# Patient Record
Sex: FEMALE | Race: WHITE | NOT HISPANIC OR LATINO | URBAN - METROPOLITAN AREA
[De-identification: names, ages, dates, MRNs, and addresses within clinical notes are randomized per-mention and may not be internally consistent; named-entity substitution may affect disease eponyms.]

---

## 2023-04-28 ENCOUNTER — APPOINTMENT (RX ONLY)
Dept: URBAN - METROPOLITAN AREA CLINIC 110 | Facility: CLINIC | Age: 58
Setting detail: DERMATOLOGY
End: 2023-04-28

## 2023-04-28 DIAGNOSIS — L98.8 OTHER SPECIFIED DISORDERS OF THE SKIN AND SUBCUTANEOUS TISSUE: ICD-10-CM

## 2023-04-28 DIAGNOSIS — Z41.9 ENCOUNTER FOR PROCEDURE FOR PURPOSES OTHER THAN REMEDYING HEALTH STATE, UNSPECIFIED: ICD-10-CM

## 2023-04-28 PROCEDURE — ? COUNSELING

## 2023-04-28 PROCEDURE — ? PLATELET RICH PLASMA INJECTION

## 2023-04-28 PROCEDURE — ? DEFER

## 2023-04-28 PROCEDURE — ? BOTOX

## 2023-04-28 PROCEDURE — ? SILHOUETTE LIFT

## 2023-04-28 PROCEDURE — ? INVENTORY

## 2023-04-28 PROCEDURE — ? PRESCRIPTION

## 2023-04-28 RX ORDER — TAZAROTENE 0.45 MG/G
LOTION TOPICAL
Qty: 45 | Refills: 1 | Status: ERX | COMMUNITY
Start: 2023-04-28

## 2023-04-28 RX ADMIN — TAZAROTENE: 0.45 LOTION TOPICAL at 00:00

## 2023-04-28 ASSESSMENT — LOCATION SIMPLE DESCRIPTION DERM
LOCATION SIMPLE: RIGHT CHEEK
LOCATION SIMPLE: LEFT CHEEK

## 2023-04-28 ASSESSMENT — LOCATION ZONE DERM: LOCATION ZONE: FACE

## 2023-04-28 ASSESSMENT — LOCATION DETAILED DESCRIPTION DERM
LOCATION DETAILED: LEFT SUPERIOR CENTRAL MALAR CHEEK
LOCATION DETAILED: RIGHT SUPERIOR CENTRAL MALAR CHEEK

## 2023-04-28 NOTE — PROCEDURE: PLATELET RICH PLASMA INJECTION
Amount Injected At This Location In Cc (Will Not Render If 0): 0
Anesthesia Type: 1% lidocaine with epinephrine
Topical Anesthesia Type: 23% lidocaine, 7% tetracaine
Consent: Written consent obtained, risks reviewed including but not limited to pain, scarring, infection and incomplete improvement.  Patient understands the procedure is cosmetic in nature and will require out of pocket payment.
Which Technique?: Default
Venipuncture Paragraph: An alcohol pad was applied to the venipuncture site. Venipuncture was performed using a butterfly needle. Pressure and a bandaid was applied to the site. Patient initialed the vial and the confirmed initials prior to injection of blood. No complications were noted.
Location #1: infraorbital areas
Depth In Mm (Will Not Render If 0): 0.5
Amount Injected At This Location In Cc (Will Not Render If 0): 3
Length Of Topical Anesthesia Application (Optional): 60 minutes
Detail Level: Zone
Post-Care Instructions: After the procedure, take precautions against sun exposure. Do not apply sunscreen for 12 hours after the procedure. Avoid NSAIDs if no contraindications for 1 week
Treatment Number (Optional): 1
Who Performed The Venipuncture?: Darron VILLALOBOS MA
Show Additional Techniques: Yes

## 2023-04-28 NOTE — PROCEDURE: SILHOUETTE LIFT
Anesthesia Type: 1% lidocaine with epinephrine
Post-Care Instructions (For The Patient Hand-Out): I reviewed with the patient in detail post-care instructions. Patient should apply ice packs multiple times a day for a couple days. They were instructed to call if they have any problems.
Detail Level: Zone
Expiration Date (Month Year): 05/2024
Postcare Statement Text: There were no complications and the patient was given postcare instructions and ice packs.
Pre-Procedure Text: The treatment areas were cleaned with alcohol and chlorhexidine. Insertion and exit points were mapped out with the Silhouette ruler and marked with a surgical marker.
Consent: The risks and benefits of the procedure were discussed with the patient.  Specifically the risks of swelling, bruising, bleeding, discomfort, infection, damage to nerves, numbness, allergic reactions, pigment changes, partial correction, rippling or dimpling, asymmetry, redness, bumps or nodules, visibility of threads, and scarring were reviewed. After this, consent was obtained.
Anesthesia Method: local infiltration
Lot #: [id number]
Procedure Text: An 18 gauge needle was used to create the insertions points and the Silhouette Instalift needles with absorbable sutures were inserted subcutaneously in each direction and advanced through to the exit points on either side. The threads were then tightened and cut adjacent to the exit points and allowed to retract into the subcutaneous space.
Number Of Threads: 10
Total Anesthesia Volume In Cc (Optional): 0

## 2023-04-28 NOTE — PROCEDURE: DEFER
Other Procedure: chemical peel , PRP, micro needling, &
Detail Level: Detailed
Introduction Text (Please End With A Colon): The following procedure was deferred:
Size Of Lesion In Cm (Optional): 0

## 2023-04-28 NOTE — PROCEDURE: BOTOX
Show Levator Superior Units: Yes
Additional Area 3 Location: orbicularis oris
Additional Area 4 Units: 0
Show Mentalis Units: No
Glabellar Complex Units: 20
Consent: Written consent obtained. Risks include but not limited to lid/brow ptosis, bruising, swelling, diplopia, temporary effect, incomplete chemical denervation.
Dilution (U/0.1 Cc): 5
Detail Level: Detailed
Additional Area 1 Location: [facility]
Post-Care Instructions: Patient instructed to not lie down for 4 hours and limit physical activity for 24 hours.
Additional Area 2 Location: Tail of brow
Periorbital Skin Units: 24
Show Inventory Tab: Show

## 2023-05-02 ENCOUNTER — APPOINTMENT (RX ONLY)
Dept: URBAN - METROPOLITAN AREA CLINIC 111 | Facility: CLINIC | Age: 58
Setting detail: DERMATOLOGY
End: 2023-05-02

## 2023-05-02 DIAGNOSIS — Z41.9 ENCOUNTER FOR PROCEDURE FOR PURPOSES OTHER THAN REMEDYING HEALTH STATE, UNSPECIFIED: ICD-10-CM

## 2023-05-02 PROCEDURE — ? COSMETIC FOLLOW-UP

## 2023-05-02 PROCEDURE — ? ADDITIONAL NOTES

## 2023-05-02 NOTE — PROCEDURE: COSMETIC FOLLOW-UP
Side Effects Or Complications: Extensive bruising
Treatment Override (Free Text): sillouette thread lift
Global Improvement: Good
Side Effects Override (Free Text): dimpling and superficial thread on lower cheek
Patient Satisfaction: Unsure
Detail Level: Zone

## 2023-05-02 NOTE — PROCEDURE: ADDITIONAL NOTES
Render Risk Assessment In Note?: no
Detail Level: Simple
Additional Notes: Dimple released with 18G needle. Gentle massage used on lower cheek thread with improvement. Pt instructed to apply warm compress.

## 2023-06-16 ENCOUNTER — APPOINTMENT (RX ONLY)
Dept: URBAN - METROPOLITAN AREA CLINIC 110 | Facility: CLINIC | Age: 58
Setting detail: DERMATOLOGY
End: 2023-06-16

## 2023-06-16 VITALS — HEIGHT: 64 IN | WEIGHT: 145 LBS

## 2023-06-16 DIAGNOSIS — Z41.9 ENCOUNTER FOR PROCEDURE FOR PURPOSES OTHER THAN REMEDYING HEALTH STATE, UNSPECIFIED: ICD-10-CM

## 2023-06-16 PROCEDURE — ? COSMETIC FOLLOW-UP

## 2023-06-16 PROCEDURE — ? ADDITIONAL NOTES

## 2023-06-16 PROCEDURE — ? FILLERS

## 2023-06-16 PROCEDURE — ? BOTOX

## 2023-06-16 PROCEDURE — ? PLATELET RICH PLASMA INJECTION

## 2023-06-16 PROCEDURE — ? INVENTORY

## 2023-06-16 ASSESSMENT — LOCATION SIMPLE DESCRIPTION DERM
LOCATION SIMPLE: LEFT CHEEK
LOCATION SIMPLE: LEFT EYEBROW
LOCATION SIMPLE: LEFT INFERIOR EYELID
LOCATION SIMPLE: RIGHT CHEEK
LOCATION SIMPLE: RIGHT EYEBROW

## 2023-06-16 ASSESSMENT — LOCATION DETAILED DESCRIPTION DERM
LOCATION DETAILED: RIGHT SUPERIOR MEDIAL BUCCAL CHEEK
LOCATION DETAILED: RIGHT SUPERIOR MEDIAL MALAR CHEEK
LOCATION DETAILED: LEFT LATERAL EYEBROW
LOCATION DETAILED: RIGHT LATERAL EYEBROW
LOCATION DETAILED: LEFT SUPERIOR MEDIAL MALAR CHEEK
LOCATION DETAILED: LEFT MEDIAL INFERIOR EYELID

## 2023-06-16 ASSESSMENT — LOCATION ZONE DERM
LOCATION ZONE: FACE
LOCATION ZONE: EYELID

## 2023-06-16 NOTE — PROCEDURE: BOTOX
Show Inventory Tab: Show
Post-Care Instructions: Patient instructed to not lie down for 4 hours and limit physical activity for 24 hours.
Consent: Written consent obtained. Risks include but not limited to lid/brow ptosis, bruising, swelling, diplopia, temporary effect, incomplete chemical denervation.
Detail Level: Detailed
Map Statement: Please see attached map for locations and injection amounts.
Total Units: 4

## 2023-06-16 NOTE — PROCEDURE: FILLERS
Nasolabial Folds Filler Volume In Cc: 0
Filler Comments: Treated area was prepped using Hibiclens and alcohol. Insertion point numbed and 23G needle was used to create entry point. Cannula was inserted and linear retrograde injection and fanning techniques were used to deposit filler.
Include Cannula Size?: 25G
Include Cannula Information In Note?: No
Anesthesia Volume In Cc: 0.5
Detail Level: Detailed
Cheeks Filler Volume In Cc: 1.4
Include Cannula Length?: 1.5 inch
Anesthesia Type: 1% lidocaine with epinephrine
Marionette Lines Filler Volume In Cc: 1
Filler: Juvederm Volux XC
Include Cannula Information In Note?: Yes
Lot #: see photo
Post-Care Instructions: Patient instructed to apply ice to reduce swelling.
Filler: Juvederm Voluma XC
Map Statment: See Attach Map for Complete Details
Consent: Written consent obtained. Risks include but not limited to bruising, bleeding, irregular texture, ulceration, infection, allergic reaction, scar formation, incomplete augmentation, temporary nature, procedural pain. Rarely intra-arterial injection can occur, which requires immediately dissolving of filler and may lead to scarring. Blindness has rarely been reported.
Filler Comments: Treated area was prepped using Hibiclens and alcohol. Insertion point numbed and 23G needle was used to create entry point. Cannula was inserted and linear retrograde injection technique was used to deposit filler.
Additional Anesthesia Volume In Cc: 6
Include Cannula Brand?: DermaSculpt
Aspiration Statement: Aspiration was performed prior to injecting site with filler.

## 2023-06-16 NOTE — PROCEDURE: ADDITIONAL NOTES
Render Risk Assessment In Note?: yes
Additional Notes: No charge, sample syringes used.
Detail Level: Simple

## 2023-06-16 NOTE — PROCEDURE: COSMETIC FOLLOW-UP
Detail Level: Zone
Patient Satisfaction: Dissatisfied
Side Effects Or Complications: Under correction
Global Improvement: Minimal
Treatment Override (Free Text): Thread lift

## 2023-06-16 NOTE — PROCEDURE: PLATELET RICH PLASMA INJECTION
Depth In Mm (Will Not Render If 0): 0
Treatment Number (Optional): 2
Show Additional Techniques: Yes
Post-Care Instructions: After the procedure, take precautions against sun exposure. Do not apply sunscreen for 12 hours after the procedure. Avoid NSAIDs if no contraindications for 1 week
Venipuncture Paragraph: An alcohol pad was applied to the venipuncture site. Venipuncture was performed using a butterfly needle. Pressure and a bandaid was applied to the site. Patient initialed the vial and the confirmed initials prior to injection of blood. No complications were noted.
Consent: Written consent obtained, risks reviewed including but not limited to pain, scarring, infection and incomplete improvement.  Patient understands the procedure is cosmetic in nature and will require out of pocket payment.
Amount Injected At This Location In Cc (Will Not Render If 0): 3
Technique 2: The PRP was applied as a glide prior to microneedling and applied topically immediately post microneedling. Injection was performed into scars.
Site Of Venipuncture?: L antecubital
Detail Level: Zone
External Cooling Fan Speed: 5
Which Technique?: Default
Anesthesia Type: 1% lidocaine with epinephrine

## 2025-01-08 ENCOUNTER — APPOINTMENT (OUTPATIENT)
Dept: URBAN - METROPOLITAN AREA CLINIC 111 | Facility: CLINIC | Age: 60
Setting detail: DERMATOLOGY
End: 2025-01-08

## 2025-01-08 VITALS — HEIGHT: 64 IN | WEIGHT: 145 LBS

## 2025-01-30 ENCOUNTER — APPOINTMENT (OUTPATIENT)
Dept: URBAN - METROPOLITAN AREA CLINIC 111 | Facility: CLINIC | Age: 60
Setting detail: DERMATOLOGY
End: 2025-01-30

## 2025-01-30 DIAGNOSIS — Z41.9 ENCOUNTER FOR PROCEDURE FOR PURPOSES OTHER THAN REMEDYING HEALTH STATE, UNSPECIFIED: ICD-10-CM

## 2025-01-30 PROCEDURE — ? INVENTORY

## 2025-01-30 PROCEDURE — ? ADDITIONAL NOTES

## 2025-01-30 PROCEDURE — ? FILLERS

## 2025-01-30 NOTE — PROCEDURE: FILLERS
Map Statment: See Attach Map for Complete Details
Additional Area 2 Volume In Cc: 0
Post-Care Instructions: Patient instructed to apply ice to reduce swelling.
Include Documentation That Aspiration Was Performed Prior To Injecting Filler:: No
Aspiration Statement: Aspiration was performed prior to injecting site with filler.
Marionette Lines Filler Volume In Cc: 0.3
Vermilion Lips Filler Volume In Cc: 0.5
Tear Troughs Filler Volume In Cc: 0.6
Anesthesia Type: 1% lidocaine with epinephrine
Include Cannula Information In Note?: Yes
Additional Anesthesia Type: 1% lidocaine without epinephrine
Include Cannula Size?: 25G
Additional Anesthesia Volume In Cc: 5
Additional Area 1 Location: chin
Include Cannula Length?: 1.5 inch
Detail Level: Detailed
Filler: Skinvive
Consent: Written consent obtained. Risks include but not limited to bruising, beading, irregular texture, ulceration, infection, allergic reaction, scar formation, incomplete augmentation, temporary nature, procedural pain.

## 2025-01-30 NOTE — PROCEDURE: ADDITIONAL NOTES
Detail Level: Simple
Additional Notes: Sample of Opzelura provided for LSC/dermatitis on eyelids
Render Risk Assessment In Note?: yes

## 2025-06-20 NOTE — PROCEDURE: COUNSELING
hypertension:, hyperlipidemia      ECG reviewed  Rhythm: regular  Rate: normal           Beta Blocker:  Dose within 24 Hrs      ROS comment: Sinus rakan     Neuro/Psych:   Negative Neuro/Psych ROS              GI/Hepatic/Renal: Neg GI/Hepatic/Renal ROS  (+) GERD: well controlled, renal disease: kidney stones          Endo/Other: Negative Endo/Other ROS                    Abdominal: normal exam      Abdomen: soft.      Vascular: negative vascular ROS.  + DVT.       Other Findings:             Anesthesia Plan      general and regional     ASA 3     (Discussed ASA monitors and general ETT anesthesia and pre op nerve block for postop pain with the patient. Risks and benefits discussed. All questions answered.)  Induction: intravenous.    MIPS: Postoperative opioids intended and Prophylactic antiemetics administered.  Anesthetic plan and risks discussed with patient.    Use of blood products discussed with patient whom consented to blood products.    Plan discussed with CRNA.    Attending anesthesiologist reviewed and agrees with Preprocedure content      Post-op pain plan if not by surgeon: single peripheral nerve block            Chani Orosco MD   6/20/2025             Detail Level: Detailed